# Patient Record
Sex: FEMALE | Race: BLACK OR AFRICAN AMERICAN | NOT HISPANIC OR LATINO | URBAN - METROPOLITAN AREA
[De-identification: names, ages, dates, MRNs, and addresses within clinical notes are randomized per-mention and may not be internally consistent; named-entity substitution may affect disease eponyms.]

---

## 2018-07-09 ENCOUNTER — EMERGENCY (EMERGENCY)
Facility: HOSPITAL | Age: 28
LOS: 0 days | Discharge: HOME | End: 2018-07-09
Attending: EMERGENCY MEDICINE | Admitting: EMERGENCY MEDICINE

## 2018-07-09 VITALS
DIASTOLIC BLOOD PRESSURE: 55 MMHG | TEMPERATURE: 99 F | HEART RATE: 60 BPM | RESPIRATION RATE: 18 BRPM | SYSTOLIC BLOOD PRESSURE: 105 MMHG | OXYGEN SATURATION: 97 %

## 2018-07-09 DIAGNOSIS — K08.89 OTHER SPECIFIED DISORDERS OF TEETH AND SUPPORTING STRUCTURES: ICD-10-CM

## 2018-07-09 DIAGNOSIS — G43.909 MIGRAINE, UNSPECIFIED, NOT INTRACTABLE, WITHOUT STATUS MIGRAINOSUS: ICD-10-CM

## 2018-07-09 NOTE — ED PROVIDER NOTE - OBJECTIVE STATEMENT
26 y/o female with no pmhx presents with tooth pain. Patient states the pain started 2 days ago, located at tooth 16 and 17, admits to the teeth breaking bit by bit over the past couple of weeks. Patient denies having a dentist. Denies fevers/chills, facial swelling, purulent drainage.

## 2018-07-09 NOTE — ED PROVIDER NOTE - ATTENDING CONTRIBUTION TO CARE
27F no pmh, p/w L dental pain #16/17 x 3 days. states tooth is breaking in pieces for weeks. no fever, chills. no discharge. no facial swelling. tolerating po and no trouble speaking. no ha, numbness, weakness. has no dentist. on exam, AFVSS, well veronica nad, ncat, eomi, perrla, mmm, poor dentition, L tooth #16/17 w decay and tenderness, no gum swelling/fluctuance, no facial swelling, aaox3, no focal deficits, no le edema or calf ttp, a/p; dental pain - will transfter to dental clinic for eval

## 2018-07-09 NOTE — ED PROVIDER NOTE - NS ED ROS FT
Constitutional: See HPI.  ENMT: +dental pain   MS: No facial swelling   Neuro: No numbness  Skin: No skin redness

## 2018-07-09 NOTE — ED PROVIDER NOTE - PHYSICAL EXAMINATION
CONSTITUTIONAL: Well-developed; well-nourished; in no acute distress.   SKIN: warm, dry; no overlying skin changes on left side of face   ENT: +partially broken teeth 16 and 17, no pulp visible, surrounding gum erythema and tenderness noted   LYMPH: No acute cervical adenopathy.  NEURO: Alert, oriented, grossly unremarkable; no numbness  PSYCH: Cooperative, appropriate.

## 2018-07-11 NOTE — ASU PATIENT PROFILE, ADULT - NPO AFTER
How Severe Is Your Skin Lesion?: mild Has Your Skin Lesion Been Treated?: not been treated Is This A New Presentation, Or A Follow-Up?: Skin Lesion 00:00

## 2018-07-12 ENCOUNTER — OUTPATIENT (OUTPATIENT)
Dept: OUTPATIENT SERVICES | Facility: HOSPITAL | Age: 28
LOS: 1 days | Discharge: HOME | End: 2018-07-12

## 2018-07-12 VITALS
SYSTOLIC BLOOD PRESSURE: 120 MMHG | HEIGHT: 70 IN | RESPIRATION RATE: 18 BRPM | HEART RATE: 57 BPM | DIASTOLIC BLOOD PRESSURE: 56 MMHG | TEMPERATURE: 97 F | WEIGHT: 128.09 LBS

## 2018-07-12 VITALS — DIASTOLIC BLOOD PRESSURE: 555 MMHG | SYSTOLIC BLOOD PRESSURE: 101 MMHG | HEART RATE: 64 BPM

## 2018-07-12 DIAGNOSIS — Z33.2 ENCOUNTER FOR ELECTIVE TERMINATION OF PREGNANCY: Chronic | ICD-10-CM

## 2018-07-12 RX ORDER — ONDANSETRON 8 MG/1
4 TABLET, FILM COATED ORAL ONCE
Qty: 0 | Refills: 0 | Status: DISCONTINUED | OUTPATIENT
Start: 2018-07-12 | End: 2018-07-27

## 2018-07-12 RX ORDER — ACETAMINOPHEN 500 MG
650 TABLET ORAL ONCE
Qty: 0 | Refills: 0 | Status: DISCONTINUED | OUTPATIENT
Start: 2018-07-12 | End: 2018-07-27

## 2018-07-12 RX ORDER — SODIUM CHLORIDE 9 MG/ML
1000 INJECTION, SOLUTION INTRAVENOUS
Qty: 0 | Refills: 0 | Status: DISCONTINUED | OUTPATIENT
Start: 2018-07-12 | End: 2018-07-27

## 2018-07-12 RX ORDER — HYDROMORPHONE HYDROCHLORIDE 2 MG/ML
0.5 INJECTION INTRAMUSCULAR; INTRAVENOUS; SUBCUTANEOUS
Qty: 0 | Refills: 0 | Status: DISCONTINUED | OUTPATIENT
Start: 2018-07-12 | End: 2018-07-12

## 2018-07-12 RX ORDER — OXYTOCIN 10 UNIT/ML
33.33 VIAL (ML) INJECTION
Qty: 20 | Refills: 0 | Status: DISCONTINUED | OUTPATIENT
Start: 2018-07-12 | End: 2018-07-27

## 2018-07-12 RX ORDER — OXYCODONE AND ACETAMINOPHEN 5; 325 MG/1; MG/1
1 TABLET ORAL ONCE
Qty: 0 | Refills: 0 | Status: DISCONTINUED | OUTPATIENT
Start: 2018-07-12 | End: 2018-07-12

## 2018-07-12 RX ADMIN — Medication 100 MILLIUNIT(S)/MIN: at 10:00

## 2018-07-12 NOTE — PRE-ANESTHESIA EVALUATION ADULT - NSANTHOSAYNRD_GEN_A_CORE
No. ERNESTO screening performed.  STOP BANG Legend: 0-2 = LOW Risk; 3-4 = INTERMEDIATE Risk; 5-8 = HIGH Risk

## 2018-07-12 NOTE — BRIEF OPERATIVE NOTE - PROCEDURE
<<-----Click on this checkbox to enter Procedure Aspiration curettage of uterus for termination of pregnancy  07/12/2018    Active  [id number]

## 2018-07-13 LAB — SURGICAL PATHOLOGY STUDY: SIGNIFICANT CHANGE UP

## 2018-07-16 DIAGNOSIS — Z33.2 ENCOUNTER FOR ELECTIVE TERMINATION OF PREGNANCY: ICD-10-CM

## 2018-08-27 ENCOUNTER — EMERGENCY (EMERGENCY)
Facility: HOSPITAL | Age: 28
LOS: 0 days | Discharge: HOME | End: 2018-08-27
Admitting: PHYSICIAN ASSISTANT

## 2018-08-27 VITALS
RESPIRATION RATE: 16 BRPM | HEART RATE: 67 BPM | SYSTOLIC BLOOD PRESSURE: 121 MMHG | OXYGEN SATURATION: 98 % | DIASTOLIC BLOOD PRESSURE: 55 MMHG | TEMPERATURE: 98 F

## 2018-08-27 DIAGNOSIS — Z33.2 ENCOUNTER FOR ELECTIVE TERMINATION OF PREGNANCY: Chronic | ICD-10-CM

## 2018-08-27 DIAGNOSIS — K03.81 CRACKED TOOTH: ICD-10-CM

## 2018-08-27 DIAGNOSIS — K08.89 OTHER SPECIFIED DISORDERS OF TEETH AND SUPPORTING STRUCTURES: ICD-10-CM

## 2018-08-27 DIAGNOSIS — K02.9 DENTAL CARIES, UNSPECIFIED: ICD-10-CM

## 2018-08-27 PROBLEM — G43.909 MIGRAINE, UNSPECIFIED, NOT INTRACTABLE, WITHOUT STATUS MIGRAINOSUS: Chronic | Status: ACTIVE | Noted: 2018-07-09

## 2018-08-27 NOTE — ED PROVIDER NOTE - OBJECTIVE STATEMENT
29 y/o female presents to the ED c/o "My teeth are killing me since yesterday. I can't eat, I can't sleep, the pain is unbearable since yesterday." no fever/ chills/ facial swelling

## 2018-08-27 NOTE — ED ADULT NURSE NOTE - NSIMPLEMENTINTERV_GEN_ALL_ED
Implemented All Universal Safety Interventions:  [place] to call system. Call bell, personal items and telephone within reach. Instruct patient to call for assistance. Room bathroom lighting operational. Non-slip footwear when patient is off stretcher. Physically safe environment: no spills, clutter or unnecessary equipment. Stretcher in lowest position, wheels locked, appropriate side rails in place.

## 2018-08-27 NOTE — PROGRESS NOTE ADULT - SUBJECTIVE AND OBJECTIVE BOX
Patient is a 28y old  Female who presents with a chief complaint of pain in the UL    HPI: Pain started a few weeks ago and the patient presented to the ED this morning b/c the pain was no longer tolerable.      PAST MEDICAL & SURGICAL HISTORY:  Migraines   delivery delivered  Encounter for elective termination of pregnancy      Allergic/Immunologic:	    Allergies    No Known Allergies      FAMILY HISTORY:      Vital Signs Last 24 Hrs  T(C): 36.8 (27 Aug 2018 10:18), Max: 36.8 (27 Aug 2018 10:18)  T(F): 98.2 (27 Aug 2018 10:18), Max: 98.2 (27 Aug 2018 10:18)  HR: 67 (27 Aug 2018 10:18) (67 - 67)  BP: 121/55 (27 Aug 2018 10:18) (121/55 - 121/55)  BP(mean): --  RR: 16 (27 Aug 2018 10:18) (16 - 16)  SpO2: 98% (27 Aug 2018 10:18) (98% - 98%)      EOE:             Mandible <<FROM>>             Facial bones and MOM <<grossly intact>>             ( N  ) trismus             ( N  ) lymphadenopathy             ( N  ) swelling             ( N  ) asymmetry             ( Y  ) palpation             ( N  ) dyspnea             ( N  ) dysphagia             ( N  ) loss of consciousness    IOE:  <<permanent>> dentition:            <<grossly intact>> OR                       hard/soft palate:  <<No pathology noted>>            tongue/FOM <<No pathology noted>>            labial/buccal mucosa <<No pathology noted>>           ( Y  ) percussion           ( Y  ) palpation           ( N  ) swelling            ( N  ) abscess           ( N  ) sinus tract    *DENTAL RADIOGRAPHS: Pre and post-op periapical radiographs of #1 taken      *ASSESSMENT: Patient is a 28y old  Female who presents with a chief complaint of pain in the UL. Patient does not present with any trismus or sinus tracts.    *PLAN: Extract #1    PROCEDURE:   Verbal and written consent given.  Anesthesia: <<2 carpules of Septocaine HCl 4% 1:100,000 epinephrine administered as buccal and palatal infiltration around #1.    >>   Treatment: << Risks/benefits discussed as per OS data sheet 00. Side/site and consent signed. 2 carpules of Septocaine HCl 4% 1:100,000 epinephrine administered as buccal and palatal infiltration around #1.  Simple extraction of #1 completed. Hemostasis achieved. Post-op radiograph taken and post-op instructions given. Amox 500 and Ibuprofen 600 prescribed.    >>     RECOMMENDATIONS:  1) <<amox 500 and ibuprofen 600   >>  2) Dental F/U with outpatient dentist for comprehensive dental care.   3) If any difficulty swallowing/breathing, fever occur, return to ER.     Jonas Fagan DDS

## 2018-12-25 ENCOUNTER — EMERGENCY (EMERGENCY)
Facility: HOSPITAL | Age: 28
LOS: 0 days | Discharge: HOME | End: 2018-12-25
Admitting: EMERGENCY MEDICINE

## 2018-12-25 VITALS — HEIGHT: 70 IN | WEIGHT: 132.06 LBS

## 2018-12-25 VITALS
SYSTOLIC BLOOD PRESSURE: 120 MMHG | DIASTOLIC BLOOD PRESSURE: 66 MMHG | HEART RATE: 83 BPM | TEMPERATURE: 99 F | RESPIRATION RATE: 18 BRPM | OXYGEN SATURATION: 100 %

## 2018-12-25 DIAGNOSIS — Z33.2 ENCOUNTER FOR ELECTIVE TERMINATION OF PREGNANCY: Chronic | ICD-10-CM

## 2018-12-25 DIAGNOSIS — K08.89 OTHER SPECIFIED DISORDERS OF TEETH AND SUPPORTING STRUCTURES: ICD-10-CM

## 2018-12-25 DIAGNOSIS — Z79.2 LONG TERM (CURRENT) USE OF ANTIBIOTICS: ICD-10-CM

## 2018-12-25 RX ORDER — KETOROLAC TROMETHAMINE 30 MG/ML
30 SYRINGE (ML) INJECTION ONCE
Qty: 0 | Refills: 0 | Status: DISCONTINUED | OUTPATIENT
Start: 2018-12-25 | End: 2018-12-25

## 2018-12-25 RX ORDER — AMOXICILLIN 250 MG/5ML
1 SUSPENSION, RECONSTITUTED, ORAL (ML) ORAL
Qty: 21 | Refills: 0 | OUTPATIENT
Start: 2018-12-25 | End: 2018-12-31

## 2018-12-25 RX ORDER — AMOXICILLIN 250 MG/5ML
500 SUSPENSION, RECONSTITUTED, ORAL (ML) ORAL ONCE
Qty: 0 | Refills: 0 | Status: COMPLETED | OUTPATIENT
Start: 2018-12-25 | End: 2018-12-25

## 2018-12-25 RX ADMIN — Medication 30 MILLIGRAM(S): at 21:53

## 2018-12-25 RX ADMIN — Medication 500 MILLIGRAM(S): at 21:53

## 2018-12-25 NOTE — ED PROVIDER NOTE - OBJECTIVE STATEMENT
27 y/o F, no significant PMHx, presents to the ED with complaints of 27 y/o F, no significant PMHx, presents to the ED with complaints of dental pain x three days. She admits to pain along her right upper tooth without associated facial swelling, nausea, vomiting, odynophagia and dysphagia. She has not yet seen her dentist. She took Ibuprofen at 1300 today.

## 2018-12-25 NOTE — ED PROVIDER NOTE - NSFOLLOWUPINSTRUCTIONS_ED_ALL_ED_FT
Follow up with your dentist/dental clinic.  See information sheet for further discharge instructions.

## 2018-12-25 NOTE — ED PROVIDER NOTE - NSFOLLOWUPCLINICS_GEN_ALL_ED_FT
[facility]  Dental  [address] [place] [id number]  Phone: [phone]  Fax:   Follow Up Time:

## 2018-12-25 NOTE — ED PROVIDER NOTE - ENMT, MLM
Airway patent, Nasal mucosa clear. Mouth with normal mucosa. Throat has no vesicles, no oropharyngeal exudates and uvula is midline.  + tenderness along tooth # 3 without surrounding gum fluctuance

## 2018-12-26 ENCOUNTER — EMERGENCY (EMERGENCY)
Facility: HOSPITAL | Age: 28
LOS: 0 days | Discharge: HOME | End: 2018-12-26
Admitting: PHYSICIAN ASSISTANT

## 2018-12-26 VITALS
DIASTOLIC BLOOD PRESSURE: 75 MMHG | OXYGEN SATURATION: 98 % | RESPIRATION RATE: 18 BRPM | TEMPERATURE: 98 F | HEART RATE: 67 BPM | SYSTOLIC BLOOD PRESSURE: 116 MMHG

## 2018-12-26 VITALS — HEIGHT: 70 IN | WEIGHT: 132.06 LBS

## 2018-12-26 DIAGNOSIS — F17.200 NICOTINE DEPENDENCE, UNSPECIFIED, UNCOMPLICATED: ICD-10-CM

## 2018-12-26 DIAGNOSIS — K08.89 OTHER SPECIFIED DISORDERS OF TEETH AND SUPPORTING STRUCTURES: ICD-10-CM

## 2018-12-26 DIAGNOSIS — Z33.2 ENCOUNTER FOR ELECTIVE TERMINATION OF PREGNANCY: Chronic | ICD-10-CM

## 2018-12-26 NOTE — PROGRESS NOTE ADULT - SUBJECTIVE AND OBJECTIVE BOX
Patient is a 28y old  Female who presents with a chief complaint of pain on upper right side.     HPI: Pain started on Monday. Went to ED yesterday and got a prescription of Amoxicillin 500mg.       PAST MEDICAL & SURGICAL HISTORY:  Migraines   delivery delivered  Encounter for elective termination of pregnancy    ( -  ) heart valve replacement  ( -  ) joint replacement  ( -  ) pregnancy    MEDICATIONS  (STANDING):  none    MEDICATIONS  (PRN):      REVIEW OF SYSTEMS  Allergies    No Known Allergies    Intolerances        FAMILY HISTORY:  No pertinent family history in first degree relatives      *SOCIAL HISTORY: ( + ) Tobacco; ( -  ) ETOH    Vital Signs Last 24 Hrs  T(C): 36.8 (26 Dec 2018 10:29), Max: 37.3 (25 Dec 2018 21:10)  T(F): 98.3 (26 Dec 2018 10:29), Max: 99.1 (25 Dec 2018 21:10)  HR: 67 (26 Dec 2018 10:29) (67 - 83)  BP: 116/75 (26 Dec 2018 10:29) (116/75 - 120/66)  BP(mean): --  RR: 18 (26 Dec 2018 10:29) (18 - 18)  SpO2: 98% (26 Dec 2018 10:29) (98% - 100%)    LABS: none ordered      Last Dental Visit: << unknown >>    EOE:  TMJ ( -  ) clicks                     ( -  ) pops                     ( -  ) crepitus             Mandible: WNL             Facial bones and MOM: intact             ( -  ) trismus             ( -  ) lymphadenopathy             ( -  ) swelling             ( -  ) asymmetry             ( -  ) palpation             ( -  ) dyspnea             ( -  ) dysphagia             ( -  ) loss of consciousness    IOE:  Permanent dentition, partially edentulous.                        hard/soft palate:  ( -  ) palatal torus, <<No pathology noted>>            tongue/FOM <<No pathology noted>>            labial/buccal mucosa <<No pathology noted>>           ( +  ) percussion           ( +  ) palpation           ( -  ) swelling            ( +  ) abscess           ( -  ) sinus tract    *DENTAL RADIOGRAPHS: 1 periapical x-ray taken.     *ASSESSMENT: Carious lesion into pulp, irreversible pulpitis on tooth #30.     *PLAN: Treatment options given to patient. Patient decided to go to private dentist and try to save the tooth by doing a root canal.     PROCEDURE:  No treatment rendered. Prescription given of Ibuprofen 600mg 1q6 prn.   	  RECOMMENDATIONS:  1) Continue prescription of Amoxicillin 500mg.   2) Dental F/U with outpatient dentist for comprehensive dental care.   3) If any difficulty swallowing/breathing, fever occur, return to ER.     Vaibhav Salas, DMD  Spectra [phone]

## 2018-12-26 NOTE — ED PROVIDER NOTE - OBJECTIVE STATEMENT
28 y.o. female presented to the ER c/o Right upper tooth ache for the past 4 days. Pt denies fever, chills, dysphagia, SOB. No other complaints.

## 2018-12-26 NOTE — ED PROVIDER NOTE - ENMT, MLM
Airway patent, Nasal mucosa clear. Mouth with normal mucosa. Throat has no vesicles, no oropharyngeal exudates and uvula is midline. (+) tenderness percussion tooth #3 with decay

## 2019-11-05 NOTE — PRE-ANESTHESIA EVALUATION ADULT - NSANTHTOBACCOSD_GEN_ALL_CORE
Blood pressure is stable.  Continue lisinopril HCTZ 20/12.5 p.o. every morning and nifedipine extended release 30 mg p.o. daily.   No

## 2021-10-13 ENCOUNTER — EMERGENCY (EMERGENCY)
Facility: HOSPITAL | Age: 31
LOS: 0 days | Discharge: HOME | End: 2021-10-13
Attending: EMERGENCY MEDICINE | Admitting: EMERGENCY MEDICINE
Payer: MEDICAID

## 2021-10-13 VITALS
SYSTOLIC BLOOD PRESSURE: 103 MMHG | DIASTOLIC BLOOD PRESSURE: 55 MMHG | OXYGEN SATURATION: 100 % | HEIGHT: 70 IN | TEMPERATURE: 98 F | WEIGHT: 141.98 LBS | HEART RATE: 60 BPM | RESPIRATION RATE: 20 BRPM

## 2021-10-13 DIAGNOSIS — K08.89 OTHER SPECIFIED DISORDERS OF TEETH AND SUPPORTING STRUCTURES: ICD-10-CM

## 2021-10-13 DIAGNOSIS — K03.81 CRACKED TOOTH: ICD-10-CM

## 2021-10-13 DIAGNOSIS — K02.63 DENTAL CARIES ON SMOOTH SURFACE PENETRATING INTO PULP: ICD-10-CM

## 2021-10-13 DIAGNOSIS — Z33.2 ENCOUNTER FOR ELECTIVE TERMINATION OF PREGNANCY: Chronic | ICD-10-CM

## 2021-10-13 DIAGNOSIS — G43.909 MIGRAINE, UNSPECIFIED, NOT INTRACTABLE, WITHOUT STATUS MIGRAINOSUS: ICD-10-CM

## 2021-10-13 PROCEDURE — 99282 EMERGENCY DEPT VISIT SF MDM: CPT

## 2021-10-13 NOTE — ED ADULT NURSE NOTE - OBJECTIVE STATEMENT
32 y/o female patient reports left lower molar pain x 1 week - noted to have cracked wisdom tooth no fever no facial swelling

## 2021-10-13 NOTE — CONSULT NOTE ADULT - SUBJECTIVE AND OBJECTIVE BOX
Patient is a 31y old  Female who presents with a chief complaint of pain in the lower left side of her mouth.     HPI: Patient reports pain started a week ago and has not gotten better. The pain is spontaneous and throbbing that comes and goes.       PAST MEDICAL & SURGICAL HISTORY:  Migraines    Encounter for elective termination of pregnancy     delivery delivered      ( -  ) heart valve replacement  ( -  ) joint replacement  ( -  ) pregnancy    Allergies    No Known Allergies    Intolerances    FAMILY HISTORY:  No pertinent family history in first degree relatives    *SOCIAL HISTORY: ( -  ) Tobacco; ( -  ) ETOH    *Last Dental Visit: n/a    Vital Signs Last 24 Hrs  T(C): 36.6 (13 Oct 2021 09:26), Max: 36.6 (13 Oct 2021 09:26)  T(F): 97.9 (13 Oct 2021 09:26), Max: 97.9 (13 Oct 2021 09:26)  HR: 60 (13 Oct 2021 09:26) (60 - 60)  BP: 103/55 (13 Oct 2021 09:26) (103/55 - 103/55)  BP(mean): --  RR: 20 (13 Oct 2021 09:26) (20 - 20)  SpO2: 100% (13 Oct 2021 09:26) (100% - 100%)      EOE:  TMJ ( -  ) clicks                     ( -  ) pops                     ( -  ) crepitus             Mandible <<FROM>>             Facial bones and MOM <<grossly intact>>             ( -  ) trismus             ( -  ) lymphadenopathy             ( -  ) swelling             ( -  ) asymmetry             ( -  ) palpation             ( -  ) dyspnea             ( -  ) dysphagia             ( -  ) loss of consciousness    IOE:  <<permanent>> dentition: <<grossly intact>>            hard/soft palate:  ( -  ) palatal torus, <<No pathology noted>>           tongue/FOM <<No pathology noted>>           labial/buccal mucosa <<No pathology noted>>           ( +  ) percussion           ( +  ) palpation           ( -  ) swelling            ( -  ) abscess           ( -  ) sinus tract    Dentition present: << Y  >>  Mobility: << N >>  Caries: << Y  >>     *DENTAL RADIOGRAPHS: 1 PANORAMIC X-ray taken.     RADIOLOGY & ADDITIONAL STUDIES: X-ray shows severe decay on #17 that is into the pulp and no coronal crown remaining.     *ASSESSMENT: Patient is a 31y old  Female who has pain in the lower left side due to infection of #17. The decay is into the pulp and has periapical pathology.     *PLAN: Extract #17     PROCEDURE:   Consequences of treatment discussed as per OS sheet dated 00. Consent obtained. Side and site completed. Anesthesia given; 1 carpule Lidocaine 2% with epinephrine (1:100K) inferior alveolar nerve block, 1 carpule Septocaine 4% with epinephrine (1:100K) mandibular infiltration. Tooth elevated and luxated and completed extraction with forceps. Socket curetted and irrigated with saline solution. Post op x-ray taken. Hemostasis achieved. Post-op instructions given.     RECOMMENDATIONS:  1) << Clindamycin and Ibuprofen  >>  2) Dental F/U with outpatient dentist for comprehensive dental care.   3) If any difficulty swallowing/breathing, fever occur, return to ER.     Roman Leal, #[id number]

## 2021-10-13 NOTE — ED ADULT NURSE NOTE - NSICDXPASTSURGICALHX_GEN_ALL_CORE_FT
PAST SURGICAL HISTORY:   delivery delivered     Encounter for elective termination of pregnancy

## 2021-10-13 NOTE — ED PROVIDER NOTE - PHYSICAL EXAMINATION
CONSTITUTIONAL: In no apparent distress.   HEAD: Normocephalic; atraumatic.   ENT: Poor dental hygiene noticed. Left lower wisdom tooth with cracking and majority of the tooth is missing; mild tenderness to palpation; mild swelling and erythema noticed in the surrounding gum with no abscess, bleeding or discharge noticed. No tongue swelling noticed. The pharynx is normal in appearance without tonsillar swelling or exudates. No uvula deviation noticed. Patient is speaking clearly, not muffled and airway is intact. Hearing is intact with good acuity to spoken voice.  Nasal septum is midline and nares are patent bilaterally. Oral mucosa is pink and moist.   NECK: No swelling of neck noticed. Neck is supple without adenopathy. Trachea is midline.   RESPIRATORY: Chest wall is symmetric without deformity. No signs of respiratory distress. Lung sounds are clear in all lobes bilaterally without rales, rhonchi, or wheezes.  CARDIOVASCULAR: Regular rate and rhythm. Normal peripheral perfusion.   GI: Abdomen is soft, non-tender, and without distention. Bowel sounds are present and normoactive in all four quadrants. No masses are noted. No rebound, guarding, or rigidity.  EXT: Normal appearance and ROM in all four extremities. No tenderness to palpation and distal pulses are normal. Sensation to the upper and lower extremities is normal bilaterally. Steady gait noted.  SKIN: Skin is warm, dry and intact without apparent rashes or lesions.

## 2021-10-13 NOTE — ED ADULT TRIAGE NOTE - CHIEF COMPLAINT QUOTE
patient reports left lower molar painx 1 week - noted to have cracked wisdom tooth no fever no facial swelling

## 2021-10-13 NOTE — ED ADULT NURSE NOTE - NSIMPLEMENTINTERV_GEN_ALL_ED
Implemented All Universal Safety Interventions:  [place] to call system. Call bell, personal items and telephone within reach. Instruct patient to call for assistance. Room bathroom lighting operational. Non-slip footwear when patient is off stretcher. Physically safe environment: no spills, clutter or unnecessary equipment. Stretcher in lowest position, wheels locked, appropriate side rails in place.

## 2021-10-13 NOTE — ED PROVIDER NOTE - OBJECTIVE STATEMENT
Pt is a 32 y/o female with hx of poor dental hygiene who presents with left lower wisdom toothache. Reports that she toutinely sees her dentist and last visit was February this year and was told by her dentist that she needs to be seen by an oral surgeon for left lower wisdom tooth removal. Reports that she has en established appointment with an oral surgeon on 25th of this month. Reports that she was eating cheetos a few days ago and felt a crack on her left lower wisdom tooth. Reports that she has been having constant dull pain, worse with chewing; as a result, she has not been eating much. Reports that she felt like the tooth pain has also been radiating to her throat and felt like she is having pain with swallowing, but reports that she is able to swallow with no SOB. Reports that she has not taken any meds to alleviate the pain. Denies fever, chills, SOB, chest pain, N/V, abdominal pain, and other associated symptoms. LMP was at the end of last month.

## 2021-10-13 NOTE — ED PROVIDER NOTE - NS ED ROS FT
Constitutional: Negative for fever, chills, weight change, and fatigue.  HENT: Negative for headache, hearing change, ear pain, nasal congestion.  Mouth: + for L lower wisdom toothache.  Cardiovascular: Negative for chest pain, palpitation, and orthopnea.  Respiratory: Negative for SOB, wheezing, cough and sputum production.  Gastrointestinal: Negative for nausea, vomiting, abdominal pain, constipation, diarrhea, hematochezia, and melena.  Genitourinary: Negative for flank pain, dysuria, urgency, frequency, hematuria, and urinary retention.  Neurological: Negative for dizziness, syncope, focal weakness, numbness, and loss of consciousness.  Musculoskeletal: Negative for joint swelling, arthralgias, back pain, neck pain, and calf cramps.

## 2021-10-13 NOTE — ED PROVIDER NOTE - ATTENDING CONTRIBUTION TO CARE
left lower wisdom toothache with exam showing cracked tooth and poor dentition. plan is to transfer to dental.

## 2021-12-27 ENCOUNTER — EMERGENCY (EMERGENCY)
Facility: HOSPITAL | Age: 31
LOS: 0 days | Discharge: HOME | End: 2021-12-27
Attending: EMERGENCY MEDICINE | Admitting: EMERGENCY MEDICINE
Payer: MEDICAID

## 2021-12-27 VITALS
TEMPERATURE: 97 F | HEART RATE: 86 BPM | OXYGEN SATURATION: 99 % | SYSTOLIC BLOOD PRESSURE: 111 MMHG | WEIGHT: 141.98 LBS | RESPIRATION RATE: 18 BRPM | HEIGHT: 70 IN | DIASTOLIC BLOOD PRESSURE: 58 MMHG

## 2021-12-27 DIAGNOSIS — Z33.2 ENCOUNTER FOR ELECTIVE TERMINATION OF PREGNANCY: Chronic | ICD-10-CM

## 2021-12-27 DIAGNOSIS — M79.10 MYALGIA, UNSPECIFIED SITE: ICD-10-CM

## 2021-12-27 DIAGNOSIS — R51.9 HEADACHE, UNSPECIFIED: ICD-10-CM

## 2021-12-27 DIAGNOSIS — U07.1 COVID-19: ICD-10-CM

## 2021-12-27 DIAGNOSIS — J02.9 ACUTE PHARYNGITIS, UNSPECIFIED: ICD-10-CM

## 2021-12-27 LAB — SARS-COV-2 RNA SPEC QL NAA+PROBE: DETECTED

## 2021-12-27 PROCEDURE — 99284 EMERGENCY DEPT VISIT MOD MDM: CPT

## 2021-12-27 RX ORDER — IBUPROFEN 200 MG
1 TABLET ORAL
Qty: 28 | Refills: 0
Start: 2021-12-27 | End: 2022-01-02

## 2021-12-27 NOTE — ED PROVIDER NOTE - NSFOLLOWUPCLINICS_GEN_ALL_ED_FT
[facility] [place] [address]  Phone: [phone]  Fax:

## 2021-12-27 NOTE — ED PROVIDER NOTE - ATTENDING CONTRIBUTION TO CARE
30 y/o p/w sore throat, HA, nausea, chills, myalgias x 2 days, persistent, denies fever, headache, ear pain, abdominal pain, n/v/d, respiratory sx, change in bowel habits or urinary sx, rash or other associated complaints at present.    VSS, awake, alert, non-toxic appearing, oropharynx clear, no tracheal deviation, non-labored breathing without accessory muscle use apparent or pursed lip breathing, no retractions, speaks full sentences, chest CTAB, no w/r/r, +S1/S2, RRR, no m/r/g, abdomen soft, NT, ND, +BS, AO x 3, clear speech and steady gait.

## 2021-12-27 NOTE — ED PROVIDER NOTE - PATIENT PORTAL LINK FT
You can access the FollowMyHealth Patient Portal offered by [facility] by registering at the following website: [email or web address]. By joining [employer]’s FollowMyHealth portal, you will also be able to view your health information using other applications (apps) compatible with our system.

## 2024-02-20 NOTE — ED ADULT NURSE NOTE - HIV OFFER
Episodes of uncontrolled nausea or vomiting not relieved by anti-nausea medication
Previously Declined (within the last year)

## 2024-04-04 NOTE — PRE-ANESTHESIA EVALUATION ADULT - NSANTHOBSERVEDRD_ENT_A_CORE
[facility] Call Center    Phone Message    May a detailed message be left on voicemail: yes     Reason for Call: Other: Pt called clinic to advise she has since moved to [place]. Pt is trying to obtain a new provider and is curious if she cannot do so by May can she go over for the EGD or does she need to have completed in May. Please review and call pt back to further discuss. Pt phone number has been updated. Thank you!     Action Taken: Message routed to:  [facility] SOFIA TEAM- [[phone]]    Travel Screening: Not Applicable                                                                   
Returned call to patient. She's relocated to [place], wondering about a local referral. Knows she's due for repeat EGD in May for stent maintenance.  Message sent to Dr. Self regarding referral to local provider.    ML  
No

## 2024-09-25 ENCOUNTER — EMERGENCY (EMERGENCY)
Facility: HOSPITAL | Age: 34
LOS: 1 days | Discharge: HOME | End: 2024-09-25
Attending: STUDENT IN AN ORGANIZED HEALTH CARE EDUCATION/TRAINING PROGRAM
Payer: MEDICAID

## 2024-09-25 VITALS
HEART RATE: 82 BPM | TEMPERATURE: 98 F | OXYGEN SATURATION: 95 % | WEIGHT: 130.07 LBS | SYSTOLIC BLOOD PRESSURE: 128 MMHG | RESPIRATION RATE: 18 BRPM | DIASTOLIC BLOOD PRESSURE: 86 MMHG | HEIGHT: 70 IN

## 2024-09-25 VITALS
TEMPERATURE: 99 F | SYSTOLIC BLOOD PRESSURE: 124 MMHG | DIASTOLIC BLOOD PRESSURE: 85 MMHG | OXYGEN SATURATION: 95 % | HEART RATE: 74 BPM | RESPIRATION RATE: 18 BRPM

## 2024-09-25 DIAGNOSIS — Z33.2 ENCOUNTER FOR ELECTIVE TERMINATION OF PREGNANCY: Chronic | ICD-10-CM

## 2024-09-25 LAB
ANION GAP SERPL CALC-SCNC: 8 MMOL/L — SIGNIFICANT CHANGE UP (ref 5–17)
BUN SERPL-MCNC: 6 MG/DL — LOW (ref 7–18)
CALCIUM SERPL-MCNC: 9.6 MG/DL — SIGNIFICANT CHANGE UP (ref 8.4–10.5)
CHLORIDE SERPL-SCNC: 110 MMOL/L — HIGH (ref 96–108)
CO2 SERPL-SCNC: 22 MMOL/L — SIGNIFICANT CHANGE UP (ref 22–31)
CREAT SERPL-MCNC: 0.83 MG/DL — SIGNIFICANT CHANGE UP (ref 0.5–1.3)
EGFR: 96 ML/MIN/1.73M2 — SIGNIFICANT CHANGE UP
FLUAV AG NPH QL: SIGNIFICANT CHANGE UP
FLUBV AG NPH QL: SIGNIFICANT CHANGE UP
GLUCOSE SERPL-MCNC: 89 MG/DL — SIGNIFICANT CHANGE UP (ref 70–99)
HCG SERPL-ACNC: <1 MIU/ML — SIGNIFICANT CHANGE UP
HCT VFR BLD CALC: 39.1 % — SIGNIFICANT CHANGE UP (ref 34.5–45)
HGB BLD-MCNC: 12.9 G/DL — SIGNIFICANT CHANGE UP (ref 11.5–15.5)
MCHC RBC-ENTMCNC: 29.5 PG — SIGNIFICANT CHANGE UP (ref 27–34)
MCHC RBC-ENTMCNC: 33 GM/DL — SIGNIFICANT CHANGE UP (ref 32–36)
MCV RBC AUTO: 89.3 FL — SIGNIFICANT CHANGE UP (ref 80–100)
NRBC # BLD: 0 /100 WBCS — SIGNIFICANT CHANGE UP (ref 0–0)
PLATELET # BLD AUTO: 247 K/UL — SIGNIFICANT CHANGE UP (ref 150–400)
POTASSIUM SERPL-MCNC: 3.7 MMOL/L — SIGNIFICANT CHANGE UP (ref 3.5–5.3)
POTASSIUM SERPL-SCNC: 3.7 MMOL/L — SIGNIFICANT CHANGE UP (ref 3.5–5.3)
RBC # BLD: 4.38 M/UL — SIGNIFICANT CHANGE UP (ref 3.8–5.2)
RBC # FLD: 15.9 % — HIGH (ref 10.3–14.5)
SARS-COV-2 RNA SPEC QL NAA+PROBE: SIGNIFICANT CHANGE UP
SODIUM SERPL-SCNC: 140 MMOL/L — SIGNIFICANT CHANGE UP (ref 135–145)
WBC # BLD: 8.44 K/UL — SIGNIFICANT CHANGE UP (ref 3.8–10.5)
WBC # FLD AUTO: 8.44 K/UL — SIGNIFICANT CHANGE UP (ref 3.8–10.5)

## 2024-09-25 PROCEDURE — 96374 THER/PROPH/DIAG INJ IV PUSH: CPT

## 2024-09-25 PROCEDURE — 71046 X-RAY EXAM CHEST 2 VIEWS: CPT

## 2024-09-25 PROCEDURE — 99284 EMERGENCY DEPT VISIT MOD MDM: CPT

## 2024-09-25 PROCEDURE — 99284 EMERGENCY DEPT VISIT MOD MDM: CPT | Mod: 25

## 2024-09-25 PROCEDURE — 87636 SARSCOV2 & INF A&B AMP PRB: CPT

## 2024-09-25 PROCEDURE — 71046 X-RAY EXAM CHEST 2 VIEWS: CPT | Mod: 26

## 2024-09-25 PROCEDURE — 85027 COMPLETE CBC AUTOMATED: CPT

## 2024-09-25 PROCEDURE — 36415 COLL VENOUS BLD VENIPUNCTURE: CPT

## 2024-09-25 PROCEDURE — 80048 BASIC METABOLIC PNL TOTAL CA: CPT

## 2024-09-25 PROCEDURE — 84702 CHORIONIC GONADOTROPIN TEST: CPT

## 2024-09-25 RX ORDER — DIPHENHYDRAMINE HCL 50 MG
1 CAPSULE ORAL
Qty: 7 | Refills: 0
Start: 2024-09-25 | End: 2024-10-01

## 2024-09-25 RX ORDER — IBUPROFEN 600 MG
1 TABLET ORAL
Qty: 21 | Refills: 0
Start: 2024-09-25 | End: 2024-10-01

## 2024-09-25 RX ORDER — FLUTICASONE PROPIONATE 50 UG/1
1 SPRAY, METERED NASAL
Qty: 1 | Refills: 0
Start: 2024-09-25 | End: 2024-10-01

## 2024-09-25 RX ORDER — SODIUM CHLORIDE 9 MG/ML
1000 INJECTION INTRAMUSCULAR; INTRAVENOUS; SUBCUTANEOUS ONCE
Refills: 0 | Status: COMPLETED | OUTPATIENT
Start: 2024-09-25 | End: 2024-09-25

## 2024-09-25 RX ORDER — ACETAMINOPHEN 325 MG/1
1000 TABLET ORAL ONCE
Refills: 0 | Status: COMPLETED | OUTPATIENT
Start: 2024-09-25 | End: 2024-09-25

## 2024-09-25 RX ADMIN — ACETAMINOPHEN 400 MILLIGRAM(S): 325 TABLET ORAL at 09:05

## 2024-09-25 RX ADMIN — SODIUM CHLORIDE 1000 MILLILITER(S): 9 INJECTION INTRAMUSCULAR; INTRAVENOUS; SUBCUTANEOUS at 09:05

## 2024-09-25 NOTE — ED ADULT TRIAGE NOTE - TEMPERATURE IN FAHRENHEIT (DEGREES F)
98.3 Colchicine Counseling:  Patient counseled regarding adverse effects including but not limited to stomach upset (nausea, vomiting, stomach pain, or diarrhea).  Patient instructed to limit alcohol consumption while taking this medication.  Colchicine may reduce blood counts especially with prolonged use.  The patient understands that monitoring of kidney function and blood counts may be required, especially at baseline. The patient verbalized understanding of the proper use and possible adverse effects of colchicine.  All of the patient's questions and concerns were addressed.

## 2024-09-25 NOTE — ED PROVIDER NOTE - NSFOLLOWUPINSTRUCTIONS_ED_ALL_ED_FT
You were evaluated in the Emergency Department today for your congestion, cough and fevers. Your evaluation suggests that your symptoms are most likely due to a viral illness, which will improve on its own with rest and fluids.    We recommend you take 600mg ibuprofen every 6 hours or tylenol 650mg every 6 hours as needed for fever. If needed, you can alternate these medications so that you take one medication every 3 hours. For instance, at noon take ibuprofen, then at 3pm take tylenol, then at 6pm take ibuprofen.    Please schedule an appointment for follow up with your primary care physician this week.    Return to the Emergency Department if you experience worsening cough, fever 100.4 ° F or greater not controlled by Tylenol or Ibuprofen, recurrent vomiting, chest pain, shortness of breath, or any other concerning symptoms.

## 2024-09-25 NOTE — ED PROVIDER NOTE - PATIENT PORTAL LINK FT
You can access the FollowMyHealth Patient Portal offered by [facility] by registering at the following website: [email or web address]. By joining [employer]’s FollowMyHealth portal, you will also be able to view your health information using other applications (apps) compatible with our system.

## 2024-09-25 NOTE — ED ADULT NURSE NOTE - OBJECTIVE STATEMENT
Pt presents to ED c/o difficulty breathing, HA, cough, throat pain x1 day. States that her symptoms began around 1am this morning. Was recently diagnosed with bronchitis. Denies any fever, chills, CP, N/V/D

## 2024-09-25 NOTE — ED PROVIDER NOTE - CLINICAL SUMMARY MEDICAL DECISION MAKING FREE TEXT BOX
+ fever, cough, sore throat, malaise consistent with viral illness.  Patient Not pregnant, from SNF, chronically ill or immunosuppressed.    Given History and Exam I have a lower suspicion for:  Emergent CardioPulmonary causes [such as Acute Asthma or COPD Exacerbation, acute Heart Failure or exacerbation, PE, PTX, atypical ACS, PNA].  Emergent Otolaryngeal causes [such as PTA, RPA, Ludwigs, Epiglottitis, EBV].  Emergent Travel or Immunosuppressive related infectious causes[such as acute HIV, SARS, MERS].

## 2024-09-25 NOTE — ED PROVIDER NOTE - OBJECTIVE STATEMENT
34 female presents reporting 2 days of bodyaches cough congestion and difficulty sleeping due to symptoms.  Reports son after going to school came home sick.  Reports similar but more mild symptoms.  Denies headache neck pain chest pain difficulty breathing.      GENERAL APPEARANCE:  AAOx3, generally well-appearing, no acute distress. Appears stated age.  HEENT:  NCAT. Moist mucous membranes. EOMI, clear conjunctiva, oropharynx clear.  NECK:  Supple without lymphadenopathy. No JVD.  No neck stiffness or restricted ROM.  HEART:  Normal heart rate and regular rhythm. No murmur.   LUNGS:  CTAB, moving air well. No crackles or wheezes are heard.  ABDOMEN:  Soft, nontender, non-distended. Negative Rubio. Negative McBurney. No rebound or guarding.  CHEST/BACK: Chest wall non-tender. No CVAT, or midline cervical/thoracic/lumbar tenderness to palpation. No obvious deformity of chest wall or back.  EXTREMITIES:  Without cyanosis, clubbing or edema. Pulse intact x 4. FROM x4. Compartments soft in all extremities.  NEUROLOGICAL:  Grossly non-focal. Alert and oriented, moving all 4 extremities. CN II-XII grossly intact. Observed to ambulate with normal gait.  SKIN:  Warm and dry without any rash.  PSYCH: Calm, cooperative. Normal mood and affect. Demonstrates proper insight and judgement.

## 2024-09-25 NOTE — ED ADULT NURSE NOTE - NSFALLUNIVINTERV_ED_ALL_ED
Bed/Stretcher in lowest position, wheels locked, appropriate side rails in place/Call bell, personal items and telephone in reach/Instruct patient to call for assistance before getting out of bed/chair/stretcher/Non-slip footwear applied when patient is off stretcher/Wall to call system/Physically safe environment - no spills, clutter or unnecessary equipment/Purposeful proactive rounding/Room/bathroom lighting operational, light cord in reach